# Patient Record
Sex: MALE | Race: WHITE
[De-identification: names, ages, dates, MRNs, and addresses within clinical notes are randomized per-mention and may not be internally consistent; named-entity substitution may affect disease eponyms.]

---

## 2018-04-08 ENCOUNTER — HOSPITAL ENCOUNTER (EMERGENCY)
Dept: HOSPITAL 54 - ER | Age: 23
Discharge: HOME | End: 2018-04-08
Payer: COMMERCIAL

## 2018-04-08 VITALS — SYSTOLIC BLOOD PRESSURE: 128 MMHG | DIASTOLIC BLOOD PRESSURE: 86 MMHG

## 2018-04-08 VITALS — WEIGHT: 180 LBS | BODY MASS INDEX: 27.28 KG/M2 | HEIGHT: 68 IN
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
## 2018-04-08 DIAGNOSIS — S70.312A: ICD-10-CM

## 2018-04-08 DIAGNOSIS — S09.8XXA: ICD-10-CM
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
## 2018-04-08 DIAGNOSIS — S40.012A: Primary | ICD-10-CM

## 2018-04-08 DIAGNOSIS — S00.83XA: ICD-10-CM

## 2018-04-08 PROCEDURE — Z7610: HCPCS

## 2018-04-08 PROCEDURE — A4606 OXYGEN PROBE USED W OXIMETER: HCPCS

## 2018-04-08 NOTE — NUR
Patient discharged under [employer] custody in stable condition. Written and verbal 
after care instructions given. Patient verbalizes understanding of instruction. 
ambulatory with a steady gait
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
